# Patient Record
Sex: FEMALE | Race: WHITE | Employment: UNEMPLOYED | URBAN - NONMETROPOLITAN AREA
[De-identification: names, ages, dates, MRNs, and addresses within clinical notes are randomized per-mention and may not be internally consistent; named-entity substitution may affect disease eponyms.]

---
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
## 2021-03-02 ENCOUNTER — OFFICE VISIT (OUTPATIENT)
Dept: FAMILY MEDICINE CLINIC | Age: 21
End: 2021-03-02
Payer: COMMERCIAL

## 2021-03-02 VITALS
BODY MASS INDEX: 21.19 KG/M2 | SYSTOLIC BLOOD PRESSURE: 100 MMHG | WEIGHT: 139.8 LBS | HEART RATE: 72 BPM | OXYGEN SATURATION: 98 % | DIASTOLIC BLOOD PRESSURE: 68 MMHG | TEMPERATURE: 98.2 F | HEIGHT: 68 IN

## 2021-03-02 DIAGNOSIS — Z23 NEED FOR INFLUENZA VACCINATION: ICD-10-CM

## 2021-03-02 DIAGNOSIS — Z76.89 ESTABLISHING CARE WITH NEW DOCTOR, ENCOUNTER FOR: Primary | ICD-10-CM

## 2021-03-02 PROCEDURE — 90688 IIV4 VACCINE SPLT 0.5 ML IM: CPT | Performed by: NURSE PRACTITIONER

## 2021-03-02 PROCEDURE — 99213 OFFICE O/P EST LOW 20 MIN: CPT | Performed by: NURSE PRACTITIONER

## 2021-03-02 PROCEDURE — 90471 IMMUNIZATION ADMIN: CPT | Performed by: NURSE PRACTITIONER

## 2021-03-02 ASSESSMENT — ENCOUNTER SYMPTOMS
CHOKING: 0
SINUS PAIN: 0
EYE REDNESS: 0
CHEST TIGHTNESS: 0
VOMITING: 0
CONSTIPATION: 0
BACK PAIN: 0
NAUSEA: 0
EYE ITCHING: 0
COUGH: 0
TROUBLE SWALLOWING: 0
EYE PAIN: 0
EYE DISCHARGE: 0
WHEEZING: 0
PHOTOPHOBIA: 0
RHINORRHEA: 0
STRIDOR: 0
SORE THROAT: 0
BLOOD IN STOOL: 0
COLOR CHANGE: 0
SINUS PRESSURE: 0
SHORTNESS OF BREATH: 0
ABDOMINAL PAIN: 0
VOICE CHANGE: 0
DIARRHEA: 0

## 2021-03-02 ASSESSMENT — PATIENT HEALTH QUESTIONNAIRE - PHQ9
SUM OF ALL RESPONSES TO PHQ9 QUESTIONS 1 & 2: 0
1. LITTLE INTEREST OR PLEASURE IN DOING THINGS: 0
2. FEELING DOWN, DEPRESSED OR HOPELESS: 0

## 2021-03-02 NOTE — PROGRESS NOTES
Sexual activity: Yes     Partners: Male     Birth control/protection: Condom   Lifestyle    Physical activity     Days per week: None     Minutes per session: None    Stress: None   Relationships    Social connections     Talks on phone: None     Gets together: None     Attends Voodoo service: None     Active member of club or organization: None     Attends meetings of clubs or organizations: None     Relationship status: None    Intimate partner violence     Fear of current or ex partner: None     Emotionally abused: None     Physically abused: None     Forced sexual activity: None   Other Topics Concern    None   Social History Narrative    None       No Known Allergies    Current Outpatient Medications   Medication Sig Dispense Refill    norethindrone-ethinyl estradiol (Jurline Clamp) 1-20 MG-MCG per tablet Take 1 tablet by mouth daily        No current facility-administered medications for this visit. The patient's past medical history, past surgical history, family history, medications, and allergies were all reviewed and updated at appropriate today. Review of Systems   Constitutional: Negative for activity change, appetite change, chills, diaphoresis, fatigue, fever and unexpected weight change. HENT: Negative for congestion, ear discharge, ear pain, hearing loss, nosebleeds, postnasal drip, rhinorrhea, sinus pressure, sinus pain, sneezing, sore throat, tinnitus, trouble swallowing and voice change. Eyes: Negative for photophobia, pain, discharge, redness and itching. Respiratory: Negative for cough, choking, chest tightness, shortness of breath, wheezing and stridor. Cardiovascular: Negative for chest pain, palpitations and leg swelling. Gastrointestinal: Negative for abdominal pain, blood in stool, constipation, diarrhea, nausea and vomiting. Endocrine: Negative for cold intolerance, heat intolerance, polydipsia and polyuria.    Genitourinary: Negative for difficulty urinating, dysuria, enuresis, flank pain, frequency, hematuria and urgency. Musculoskeletal: Negative for back pain, gait problem, joint swelling, neck pain and neck stiffness. Skin: Negative for color change, pallor, rash and wound. Allergic/Immunologic: Negative for environmental allergies and food allergies. Neurological: Negative for dizziness, tremors, syncope, speech difficulty, weakness, light-headedness, numbness and headaches. Hematological: Negative for adenopathy. Does not bruise/bleed easily. Psychiatric/Behavioral: Negative for agitation, behavioral problems, confusion, decreased concentration, dysphoric mood, hallucinations, self-injury, sleep disturbance and suicidal ideas. The patient is not nervous/anxious and is not hyperactive. Physical Exam  Vitals signs reviewed. Constitutional:       General: She is not in acute distress. Appearance: Normal appearance. She is well-developed. HENT:      Head: Normocephalic and atraumatic. Right Ear: Hearing and external ear normal.      Left Ear: Hearing and external ear normal.      Nose: Nose normal.      Right Sinus: No maxillary sinus tenderness or frontal sinus tenderness. Left Sinus: No maxillary sinus tenderness or frontal sinus tenderness. Mouth/Throat:      Pharynx: No oropharyngeal exudate. Eyes:      General:         Right eye: No discharge. Left eye: No discharge. Conjunctiva/sclera: Conjunctivae normal.      Pupils: Pupils are equal, round, and reactive to light. Neck:      Musculoskeletal: Normal range of motion. Thyroid: No thyromegaly. Vascular: No JVD. Trachea: No tracheal deviation. Cardiovascular:      Rate and Rhythm: Normal rate and regular rhythm. Heart sounds: Normal heart sounds. No murmur. No friction rub. Pulmonary:      Effort: Pulmonary effort is normal. No respiratory distress. Breath sounds: Normal breath sounds. No stridor.  No decreased breath sounds, wheezing, rhonchi or rales. Musculoskeletal: Normal range of motion. General: No tenderness. Lymphadenopathy:      Cervical: No cervical adenopathy. Skin:     General: Skin is warm and dry. Capillary Refill: Capillary refill takes less than 2 seconds. Findings: No rash. Neurological:      Mental Status: She is alert and oriented to person, place, and time. Sensory: Sensation is intact. Motor: Motor function is intact. Coordination: Coordination normal.   Psychiatric:         Attention and Perception: Attention and perception normal.         Mood and Affect: Mood normal.         Speech: Speech normal.         Behavior: Behavior normal. Behavior is cooperative. Thought Content: Thought content normal.         Cognition and Memory: Cognition normal.         Judgment: Judgment normal.         Assessment:  Encounter Diagnoses   Name Primary?  Establishing care with new doctor, encounter for Yes    Need for influenza vaccination        Plan:  1. Establishing care with new doctor, encounter for  - Healthy 20 yo female. 2. Need for influenza vaccination    - INFLUENZA, QUADV, 3 YRS AND OLDER, IM, MDV, 0.5ML (Margarita Stock)    Will have her follow up yearly. Overall she is a healthy 21 y.o. female. Return in about 1 year (around 3/2/2022).

## 2022-04-14 ENCOUNTER — TELEMEDICINE (OUTPATIENT)
Dept: FAMILY MEDICINE CLINIC | Age: 22
End: 2022-04-14
Payer: COMMERCIAL

## 2022-04-14 DIAGNOSIS — J01.00 ACUTE MAXILLARY SINUSITIS, RECURRENCE NOT SPECIFIED: Primary | ICD-10-CM

## 2022-04-14 PROCEDURE — 99212 OFFICE O/P EST SF 10 MIN: CPT

## 2022-04-14 RX ORDER — AMOXICILLIN AND CLAVULANATE POTASSIUM 875; 125 MG/1; MG/1
1 TABLET, FILM COATED ORAL 2 TIMES DAILY
Qty: 20 TABLET | Refills: 0 | Status: SHIPPED | OUTPATIENT
Start: 2022-04-14 | End: 2022-04-24

## 2022-04-14 ASSESSMENT — PATIENT HEALTH QUESTIONNAIRE - PHQ9
2. FEELING DOWN, DEPRESSED OR HOPELESS: 0
SUM OF ALL RESPONSES TO PHQ QUESTIONS 1-9: 0
SUM OF ALL RESPONSES TO PHQ9 QUESTIONS 1 & 2: 0
1. LITTLE INTEREST OR PLEASURE IN DOING THINGS: 0
SUM OF ALL RESPONSES TO PHQ QUESTIONS 1-9: 0

## 2022-04-14 ASSESSMENT — ENCOUNTER SYMPTOMS
COUGH: 0
CHEST TIGHTNESS: 0
EYE DISCHARGE: 0
CHOKING: 0
SINUS PRESSURE: 1
CONSTIPATION: 0
WHEEZING: 0
RHINORRHEA: 1
SHORTNESS OF BREATH: 0
ABDOMINAL DISTENTION: 0
TROUBLE SWALLOWING: 0
DIARRHEA: 0
SORE THROAT: 1
COLOR CHANGE: 0
EYE PAIN: 0
ABDOMINAL PAIN: 0

## 2022-04-14 NOTE — PROGRESS NOTES
2022    TELEHEALTH EVALUATION -- Audio/Visual (During ZCRUA-05 public health emergency)    HPI:    Odette Lyle (:  2000) has requested an audio/video evaluation for the following concern(s):    Seen today through [facility] [address] for on and off sinus congestion. Over the last week has re developed her symptoms. Is tender to touch in her maxillary and frontal sinus regions. Ears do have some fullness bilaterally. Cough is dry and intermittent. Tested negative for strep couple weeks ago when she had similar symptoms. Has been taking Musinex and generic sinus allergy tablet. Review of Systems   Constitutional: Negative for activity change, appetite change, chills, fatigue, fever and unexpected weight change. HENT: Positive for congestion, rhinorrhea, sinus pressure and sore throat. Negative for trouble swallowing. Bilateral ear fullness     Eyes: Negative for pain, discharge and visual disturbance. Respiratory: Negative for cough, choking, chest tightness, shortness of breath and wheezing. Cardiovascular: Negative for chest pain, palpitations and leg swelling. Gastrointestinal: Negative for abdominal distention, abdominal pain, constipation and diarrhea. Endocrine: Negative for cold intolerance and heat intolerance. Genitourinary: Negative for difficulty urinating. Musculoskeletal: Positive for myalgias. Negative for gait problem and neck stiffness. Skin: Negative for color change and rash. Neurological: Positive for headaches. Negative for dizziness and weakness. Psychiatric/Behavioral: Negative for dysphoric mood and sleep disturbance. Prior to Visit Medications    Medication Sig Taking?  Authorizing Provider   amoxicillin-clavulanate (AUGMENTIN) 875-125 MG per tablet Take 1 tablet by mouth 2 times daily for 10 days Yes Polly Talbot, APRN - CNP   norethindrone-ethinyl estradiol (MICROGESTIN) 1-20 MG-MCG per tablet Take 1 tablet by mouth daily  Yes Historical Provider, MD       Social History     Tobacco Use    Smoking status: Never Smoker    Smokeless tobacco: Never Used   Vaping Use    Vaping Use: Never used   Substance Use Topics    Alcohol use: No    Drug use: No        No Known Allergies, History reviewed. No pertinent past medical history. PHYSICAL EXAMINATION:  [ INSTRUCTIONS:  \"[x]\" Indicates a positive item  \"[]\" Indicates a negative item  -- DELETE ALL ITEMS NOT EXAMINED]  Vital Signs: (As obtained by patient/caregiver or practitioner observation)    Blood pressure-N/A heart rate-N/A   respiratory rate-N/A   temperature-N/A pulse oximetry-N/A    Constitutional: [x] Appears well-developed and well-nourished [x] No apparent distress      [] Abnormal-   Mental status  [x] Alert and awake  [x] Oriented to person/place/time [x]Able to follow commands      Eyes:  EOM    [x]  Normal  [] Abnormal-  Sclera  []  Normal  [] Abnormal -         Discharge [x]  None visible  [] Abnormal -    HENT:   [x] Normocephalic, atraumatic.   [] Abnormal   [] Mouth/Throat: Mucous membranes are moist.     External Ears [x] Normal  [] Abnormal-     Neck: [x] No visualized mass     Pulmonary/Chest: [x] Respiratory effort normal.  [x] No visualized signs of difficulty breathing or respiratory distress        [] Abnormal-      Musculoskeletal:   [] Normal gait with no signs of ataxia         [x] Normal range of motion of neck        [] Abnormal-       Neurological:        [x] No Facial Asymmetry (Cranial nerve 7 motor function) (limited exam to video visit)          [] No gaze palsy        [] Abnormal-         Skin:        [x] No significant exanthematous lesions or discoloration noted on facial skin         [] Abnormal-            Psychiatric:       [x] Normal Affect [] No Hallucinations        [] Abnormal-     Other pertinent observable physical exam findings-none    Due to this being a TeleHealth encounter, evaluation of the following organ systems is limited: Vitals/Constitutional/EENT/Resp/CV/GI//MS/Neuro/Skin/Heme-Lymph-Imm. ASSESSMENT/PLAN:  1. Acute maxillary sinusitis, recurrence not specified  See above HPI for patient's symptoms and onset. Given the fact I cannot assess the patient's ears during our visit to assess for effusion or infection in addition to the patient's ongoing sinus congestion I will go and treat at this time for sinus infection. We will place the patient on Augmentin, see below. Encouraged the patient to use Flonase nasal spray in addition to daily Zyrtec or Claritin to help with sinus congestion as well as bilateral ear fullness. Patient will follow back up with office if symptoms fail to improve or worsen. - amoxicillin-clavulanate (AUGMENTIN) 875-125 MG per tablet; Take 1 tablet by mouth 2 times daily for 10 days  Dispense: 20 tablet; Refill: 0    10 minutes spent on patient care today    No follow-ups on file. An  electronic signature was used to authenticate this note. This document was prepared by a combination of typing and transcription through a voice recognition software. --HALLEY gNuyen - CNP on 4/14/2022 at 5:18 PM        Pursuant to the emergency declaration under the [facility], 1135 waiver authority and the [employer] and [employer] Act, this Virtual  Visit was conducted, with patient's consent, to reduce the patient's risk of exposure to COVID-19 and provide continuity of care for an established patient. Services were provided through a video synchronous discussion virtually to substitute for in-person clinic visit.

## 2022-12-12 ENCOUNTER — TELEMEDICINE (OUTPATIENT)
Dept: FAMILY MEDICINE CLINIC | Age: 22
End: 2022-12-12
Payer: COMMERCIAL

## 2022-12-12 DIAGNOSIS — H57.89 EYE REDNESS: Primary | ICD-10-CM

## 2022-12-12 PROCEDURE — 99212 OFFICE O/P EST SF 10 MIN: CPT

## 2022-12-12 RX ORDER — CIPROFLOXACIN 250 MG/1
250 TABLET, FILM COATED ORAL 2 TIMES DAILY
Qty: 14 TABLET | Refills: 0 | Status: SHIPPED | OUTPATIENT
Start: 2022-12-12 | End: 2022-12-19

## 2022-12-12 ASSESSMENT — ENCOUNTER SYMPTOMS
EYE PAIN: 0
SORE THROAT: 1
EYE DISCHARGE: 1
EYE ITCHING: 0
SINUS PAIN: 0
RHINORRHEA: 1
SINUS PRESSURE: 1
WHEEZING: 0
COUGH: 1
EYE REDNESS: 1
GASTROINTESTINAL NEGATIVE: 1
TROUBLE SWALLOWING: 1

## 2022-12-12 NOTE — PROGRESS NOTES
2022    TELEHEALTH EVALUATION -- Audio/Visual (During DIHDK-18 public health emergency)    HPI:    Teri Fisher (:  2000) has requested an audio/video evaluation for the following concern(s): This past Saturday went to urgent care. Was tested for strep and was positive and ear infection in right ear, flu neg. Put pt on amoxicillin, capmist,Flonase. Today woke up with crusting green discharge around both eyes. Per pt both eyes are very red and draining. Works as a . Sore throat is improving. Review of Systems   Constitutional: Negative. HENT:  Positive for congestion, ear pain, rhinorrhea, sinus pressure, sore throat and trouble swallowing. Negative for ear discharge, postnasal drip and sinus pain. Eyes:  Positive for discharge and redness. Negative for pain and itching. Respiratory:  Positive for cough. Negative for wheezing. Cardiovascular: Negative. Gastrointestinal: Negative. Musculoskeletal: Negative. Neurological: Negative. Psychiatric/Behavioral: Negative. Prior to Visit Medications    Medication Sig Taking? Authorizing Provider   ciprofloxacin (CIPRO) 250 MG tablet Take 1 tablet by mouth 2 times daily for 7 days Take 1 to 2 drops in eyes every 2 hours while awake x2 days, then every 4 hours x5 days Yes HALLEY Neville - CNP   norethindrone-ethinyl estradiol (MICROGESTIN ) 1-20 MG-MCG per tablet Take 1 tablet by mouth daily  Yes Historical Provider, MD       Social History     Tobacco Use    Smoking status: Never    Smokeless tobacco: Never   Vaping Use    Vaping Use: Never used   Substance Use Topics    Alcohol use: No    Drug use: No        No Known Allergies, History reviewed. No pertinent past medical history.     PHYSICAL EXAMINATION:  [ INSTRUCTIONS:  \"[x]\" Indicates a positive item  \"[]\" Indicates a negative item  -- DELETE ALL ITEMS NOT EXAMINED]  Vital Signs: (As obtained by patient/caregiver or practitioner clogged eustachian tubes she could have residual backup in her eyes with the crusting/drainage that was noted. I encouraged the patient to continue with Flonase and add Zyrtec x1 week to her medication regimen to help with sinus congestion. Given patient symptoms and visualization of bilateral eye redness and wound to go and place Cipro eyedrops to her pharmacy on file. Patient will complete prescription course also prior to her by urgent care as well as Cipro eyedrops being provided today and follow back up with office if symptoms fail to improve or worsen. - ciprofloxacin (CIPRO) 250 MG tablet; Take 1 tablet by mouth 2 times daily for 7 days Take 1 to 2 drops in eyes every 2 hours while awake x2 days, then every 4 hours x5 days  Dispense: 14 tablet; Refill: 0    No follow-ups on file. An  electronic signature was used to authenticate this note. --HALLEY Atkinson - CNP on 12/12/2022 at 8:21 AM        Pursuant to the emergency declaration under the [facility] waiver authority and the [employer] and [employer] Act, this Virtual  Visit was conducted, with patient's consent, to reduce the patient's risk of exposure to COVID-19 and provide continuity of care for an established patient. Services were provided through a video synchronous discussion virtually to substitute for in-person clinic visit.

## 2023-10-30 ENCOUNTER — TELEMEDICINE (OUTPATIENT)
Dept: FAMILY MEDICINE CLINIC | Age: 23
End: 2023-10-30
Payer: COMMERCIAL

## 2023-10-30 DIAGNOSIS — U07.1 COVID: Primary | ICD-10-CM

## 2023-10-30 PROCEDURE — 99213 OFFICE O/P EST LOW 20 MIN: CPT

## 2023-10-30 ASSESSMENT — ENCOUNTER SYMPTOMS
SHORTNESS OF BREATH: 0
COUGH: 1
WHEEZING: 0
